# Patient Record
(demographics unavailable — no encounter records)

---

## 2025-04-01 NOTE — DISCUSSION/SUMMARY
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 She has a history of ovarian cancer, resected; however, it does not appear to be related. This is not consistent with a met. I think it is appropriate to proceed with the hip replacement surgery. It may take time for the surgeon to get through this blastic bone. I will discuss this with the surgeon. I can see her back again as needed.  If imaging or pathology/biopsy was ordered, the patient was told to make an appointment to review findings right after all imaging is completed.   We discussed risks, benefits and alternatives. Rationale of care was reviewed and all questions were answered.

## 2025-04-01 NOTE — DISCUSSION/SUMMARY
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 She has a history of ovarian cancer, resected; however, it does not appear to be related. This is not consistent with a met. I think it is appropriate to proceed with the hip replacement surgery. It may take time for the surgeon to get through this blastic bone. I will discuss this with the surgeon. I can see her back again as needed.  If imaging or pathology/biopsy was ordered, the patient was told to make an appointment to review findings right after all imaging is completed.   We discussed risks, benefits and alternatives. Rationale of care was reviewed and all questions were answered.

## 2025-04-01 NOTE — HISTORY OF PRESENT ILLNESS
[FreeTextEntry1] : This is a 66 year old woman who had been having significant abdominal pain and hip pain since February 2025. She previously had a left knee replacement. She got an MRI scan showing a lesion in the right proximal femur. An endoscopy found nothing wrong in the belly. She has been scheduled for hip replacement with Dr. Martinez, though wanted to have this lesion checked out before proceeding. She was not having significant pain before February. Tylenol does not help her. She cannot take NSAIDs because she is on Eliquis. She is otherwise intact.

## 2025-04-01 NOTE — END OF VISIT
[Normal Growth] : growth [Normal Development] : development [FreeTextEntry3] : All medical record entries made by the Scribe were at my, Dr. Davey Stoner, direction and personally dictated by me on 03/31/2025. I have reviewed the chart and agree that the record accurately reflects my personal performance of the history, physical exam, assessment and plan. I have also personally directed, reviewed, and agreed with the chart. [No Elimination Concerns] : elimination [None] : No known medical problems [No Skin Concerns] : skin [No Feeding Concerns] : feeding [Normal Sleep Pattern] : sleep [School] : school [Development and Mental Health] : development and mental health [Nutrition and Physical Activity] : nutrition and physical activity [No Medications] : ~He/She~ is not on any medications [Oral Health] : oral health [Safety] : safety [Patient] : patient [Full Activity without restrictions including Physical Education & Athletics] : Full Activity without restrictions including Physical Education & Athletics [FreeTextEntry1] : Continue balanced diet with all food groups. Brush teeth twice a day with toothbrush. Recommend visit to dentist. Help child to maintain consistent daily routines and sleep schedule. Personal hygiene and puberty explained. School discussed. Ensure home is safe. Teach child about personal safety. Use consistent, positive discipline. Limit screen time to no more than 2 hours per day. Encourage physical activity. Return 1 year for routine well child check. 5-2-1-0 questionnaire reviewed and I discussed components of 5-2-1-0 healthy living with patient and family.  Recommended 5 servings of fruits and vegetables a day, less than 2 hours of screen time per day, 1 hour of exercise per day and zero sugar sweetened beverages. Parent(s) have no issues or concerns. Discussed in the preferred language of English Return 1 year for routine well child check. Preop next week as scheduled

## 2025-04-01 NOTE — END OF VISIT
[FreeTextEntry3] : All medical record entries made by the Scribe were at my, Dr. Davey Stoner, direction and personally dictated by me on 03/31/2025. I have reviewed the chart and agree that the record accurately reflects my personal performance of the history, physical exam, assessment and plan. I have also personally directed, reviewed, and agreed with the chart.

## 2025-04-01 NOTE — DATA REVIEWED
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 No radiographic correlate available or prior study in our PACS system. - Moderate osteoarthrosis of the right hip with trace effusion. - Mild intertrochanteric bursitis bilaterally.  X-rays from February 2025 compared to those from 1/4/2023 show the same small sclerotic lesion just below the lesser trochanter with no change in size.

## 2025-04-01 NOTE — PHYSICAL EXAM
[General Appearance - Well-Appearing] : Well appearing [General Appearance - Well Nourished] : well nourished [Oriented To Time, Place, And Person] : Oriented to person, place, and time [Sclera] : the sclera and conjunctiva were normal [Neck Cervical Mass (___cm)] : no neck mass was observed [Heart Rate And Rhythm] : heart rate was normal and rhythm regular [] : No respiratory distress [Abdomen Soft] : Soft [Normal Station and Gait] : gait and station were normal [FreeTextEntry1] : On exam she has some left knee pain with ROM from 0-115 degrees with some patellofemoral-type pain. She has right hip pain with ROM up to 100 degrees flexion, 20 degrees of external rotation, and 15 degrees of internal rotation with pain. She is able to bear weight on it with pain in the groin. She is otherwise neurovascularly intact.

## 2025-04-01 NOTE — DATA REVIEWED
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 No radiographic correlate available or prior study in our PACS system. - Moderate osteoarthrosis of the right hip with trace effusion. - Mild intertrochanteric bursitis bilaterally.  X-rays from February 2025 compared to those from 1/4/2023 show the same small sclerotic lesion just below the lesser trochanter with no change in size.